# Patient Record
Sex: MALE | Race: WHITE | NOT HISPANIC OR LATINO | Employment: FULL TIME
[De-identification: names, ages, dates, MRNs, and addresses within clinical notes are randomized per-mention and may not be internally consistent; named-entity substitution may affect disease eponyms.]

---

## 2022-08-27 ENCOUNTER — HEALTH MAINTENANCE LETTER (OUTPATIENT)
Age: 42
End: 2022-08-27

## 2022-10-18 ENCOUNTER — OFFICE VISIT (OUTPATIENT)
Dept: PEDIATRICS | Facility: CLINIC | Age: 42
End: 2022-10-18
Payer: COMMERCIAL

## 2022-10-18 VITALS
DIASTOLIC BLOOD PRESSURE: 70 MMHG | RESPIRATION RATE: 14 BRPM | HEIGHT: 72 IN | TEMPERATURE: 97.4 F | HEART RATE: 87 BPM | BODY MASS INDEX: 35.13 KG/M2 | OXYGEN SATURATION: 98 % | SYSTOLIC BLOOD PRESSURE: 124 MMHG | WEIGHT: 259.4 LBS

## 2022-10-18 DIAGNOSIS — Z13.1 SCREENING FOR DIABETES MELLITUS: ICD-10-CM

## 2022-10-18 DIAGNOSIS — Z13.220 SCREENING FOR HYPERLIPIDEMIA: ICD-10-CM

## 2022-10-18 DIAGNOSIS — Z00.00 ROUTINE GENERAL MEDICAL EXAMINATION AT A HEALTH CARE FACILITY: Primary | ICD-10-CM

## 2022-10-18 DIAGNOSIS — Z11.59 NEED FOR HEPATITIS C SCREENING TEST: ICD-10-CM

## 2022-10-18 DIAGNOSIS — Z23 NEED FOR HPV VACCINATION: ICD-10-CM

## 2022-10-18 DIAGNOSIS — Z30.09 VASECTOMY EVALUATION: ICD-10-CM

## 2022-10-18 DIAGNOSIS — Z23 NEED FOR TDAP VACCINATION: ICD-10-CM

## 2022-10-18 DIAGNOSIS — Z23 NEED FOR HEPATITIS B VACCINATION: ICD-10-CM

## 2022-10-18 LAB — HBA1C MFR BLD: 5.3 % (ref 0–5.6)

## 2022-10-18 PROCEDURE — 36415 COLL VENOUS BLD VENIPUNCTURE: CPT | Performed by: STUDENT IN AN ORGANIZED HEALTH CARE EDUCATION/TRAINING PROGRAM

## 2022-10-18 PROCEDURE — 86803 HEPATITIS C AB TEST: CPT | Performed by: STUDENT IN AN ORGANIZED HEALTH CARE EDUCATION/TRAINING PROGRAM

## 2022-10-18 PROCEDURE — 80053 COMPREHEN METABOLIC PANEL: CPT | Performed by: STUDENT IN AN ORGANIZED HEALTH CARE EDUCATION/TRAINING PROGRAM

## 2022-10-18 PROCEDURE — 90746 HEPB VACCINE 3 DOSE ADULT IM: CPT | Performed by: STUDENT IN AN ORGANIZED HEALTH CARE EDUCATION/TRAINING PROGRAM

## 2022-10-18 PROCEDURE — 90471 IMMUNIZATION ADMIN: CPT | Performed by: STUDENT IN AN ORGANIZED HEALTH CARE EDUCATION/TRAINING PROGRAM

## 2022-10-18 PROCEDURE — 80061 LIPID PANEL: CPT | Performed by: STUDENT IN AN ORGANIZED HEALTH CARE EDUCATION/TRAINING PROGRAM

## 2022-10-18 PROCEDURE — 90715 TDAP VACCINE 7 YRS/> IM: CPT | Performed by: STUDENT IN AN ORGANIZED HEALTH CARE EDUCATION/TRAINING PROGRAM

## 2022-10-18 PROCEDURE — 90472 IMMUNIZATION ADMIN EACH ADD: CPT | Performed by: STUDENT IN AN ORGANIZED HEALTH CARE EDUCATION/TRAINING PROGRAM

## 2022-10-18 PROCEDURE — 99386 PREV VISIT NEW AGE 40-64: CPT | Mod: 25 | Performed by: STUDENT IN AN ORGANIZED HEALTH CARE EDUCATION/TRAINING PROGRAM

## 2022-10-18 PROCEDURE — 83036 HEMOGLOBIN GLYCOSYLATED A1C: CPT | Performed by: STUDENT IN AN ORGANIZED HEALTH CARE EDUCATION/TRAINING PROGRAM

## 2022-10-18 PROCEDURE — 90651 9VHPV VACCINE 2/3 DOSE IM: CPT | Performed by: STUDENT IN AN ORGANIZED HEALTH CARE EDUCATION/TRAINING PROGRAM

## 2022-10-18 SDOH — ECONOMIC STABILITY: TRANSPORTATION INSECURITY
IN THE PAST 12 MONTHS, HAS LACK OF TRANSPORTATION KEPT YOU FROM MEETINGS, WORK, OR FROM GETTING THINGS NEEDED FOR DAILY LIVING?: NO

## 2022-10-18 SDOH — ECONOMIC STABILITY: FOOD INSECURITY: WITHIN THE PAST 12 MONTHS, YOU WORRIED THAT YOUR FOOD WOULD RUN OUT BEFORE YOU GOT MONEY TO BUY MORE.: NEVER TRUE

## 2022-10-18 SDOH — ECONOMIC STABILITY: TRANSPORTATION INSECURITY
IN THE PAST 12 MONTHS, HAS THE LACK OF TRANSPORTATION KEPT YOU FROM MEDICAL APPOINTMENTS OR FROM GETTING MEDICATIONS?: NO

## 2022-10-18 SDOH — ECONOMIC STABILITY: FOOD INSECURITY: WITHIN THE PAST 12 MONTHS, THE FOOD YOU BOUGHT JUST DIDN'T LAST AND YOU DIDN'T HAVE MONEY TO GET MORE.: NEVER TRUE

## 2022-10-18 SDOH — HEALTH STABILITY: PHYSICAL HEALTH: ON AVERAGE, HOW MANY MINUTES DO YOU ENGAGE IN EXERCISE AT THIS LEVEL?: 20 MIN

## 2022-10-18 SDOH — ECONOMIC STABILITY: INCOME INSECURITY: HOW HARD IS IT FOR YOU TO PAY FOR THE VERY BASICS LIKE FOOD, HOUSING, MEDICAL CARE, AND HEATING?: NOT HARD AT ALL

## 2022-10-18 SDOH — HEALTH STABILITY: PHYSICAL HEALTH: ON AVERAGE, HOW MANY DAYS PER WEEK DO YOU ENGAGE IN MODERATE TO STRENUOUS EXERCISE (LIKE A BRISK WALK)?: 6 DAYS

## 2022-10-18 SDOH — ECONOMIC STABILITY: INCOME INSECURITY: IN THE LAST 12 MONTHS, WAS THERE A TIME WHEN YOU WERE NOT ABLE TO PAY THE MORTGAGE OR RENT ON TIME?: NO

## 2022-10-18 ASSESSMENT — ENCOUNTER SYMPTOMS
NERVOUS/ANXIOUS: 0
HEADACHES: 0
NAUSEA: 0
SHORTNESS OF BREATH: 0
COUGH: 0
CHILLS: 0
WEAKNESS: 0
EYE PAIN: 0
DIARRHEA: 0
ABDOMINAL PAIN: 0
HEARTBURN: 0
SORE THROAT: 0
DIZZINESS: 0
JOINT SWELLING: 0
HEMATOCHEZIA: 0
PALPITATIONS: 0
CONSTIPATION: 0
HEMATURIA: 0
ARTHRALGIAS: 0
FREQUENCY: 0
MYALGIAS: 0
DYSURIA: 0
FEVER: 0
PARESTHESIAS: 0

## 2022-10-18 ASSESSMENT — LIFESTYLE VARIABLES
SKIP TO QUESTIONS 9-10: 0
HOW OFTEN DO YOU HAVE SIX OR MORE DRINKS ON ONE OCCASION: PATIENT DECLINED
AUDIT-C TOTAL SCORE: -1
HOW OFTEN DO YOU HAVE A DRINK CONTAINING ALCOHOL: 2-3 TIMES A WEEK
HOW MANY STANDARD DRINKS CONTAINING ALCOHOL DO YOU HAVE ON A TYPICAL DAY: 3 OR 4

## 2022-10-18 ASSESSMENT — SOCIAL DETERMINANTS OF HEALTH (SDOH)
ARE YOU MARRIED, WIDOWED, DIVORCED, SEPARATED, NEVER MARRIED, OR LIVING WITH A PARTNER?: LIVING WITH PARTNER
HOW OFTEN DO YOU ATTEND CHURCH OR RELIGIOUS SERVICES?: NEVER
HOW OFTEN DO YOU GET TOGETHER WITH FRIENDS OR RELATIVES?: PATIENT DECLINED
IN A TYPICAL WEEK, HOW MANY TIMES DO YOU TALK ON THE PHONE WITH FAMILY, FRIENDS, OR NEIGHBORS?: MORE THAN THREE TIMES A WEEK
DO YOU BELONG TO ANY CLUBS OR ORGANIZATIONS SUCH AS CHURCH GROUPS UNIONS, FRATERNAL OR ATHLETIC GROUPS, OR SCHOOL GROUPS?: NO

## 2022-10-18 NOTE — PROGRESS NOTES
SUBJECTIVE:   CC: Mars is an 42 year old who presents for preventative health visit.     Patient has been advised of split billing requirements and indicates understanding: Yes     Healthy Habits:     Getting at least 3 servings of Calcium per day:  NO    Bi-annual eye exam:  NO    Dental care twice a year:  NO    Sleep apnea or symptoms of sleep apnea:  None    Diet:  Regular (no restrictions)    Frequency of exercise:  2-3 days/week    Duration of exercise:  15-30 minutes    Taking medications regularly:  Yes    Medication side effects:  None    PHQ-2 Total Score: 0    Additional concerns today:  Yes    Lives in [place]  [place] live [place] in [facility]  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 Yes    Have you ever done Advance Care Planning? (For example, a Health Directive, POLST, or a discussion with a medical provider or your loved ones about your wishes): No, advance care planning information given to patient to review.  Patient declined advance care planning discussion at this time.    Social History     Tobacco Use     Smoking status: Never     Passive exposure: Never     Smokeless tobacco: Never   Substance Use Topics     Alcohol use: Not on file         Alcohol Use 10/18/2022   Prescreen: >3 drinks/day or >7 drinks/week? Yes   AUDIT SCORE  6       Last PSA: No results found for: PSA    Reviewed orders with patient. Reviewed health maintenance and updated orders accordingly - Yes  Lab work is in process    Reviewed and updated as  needed this visit by clinical staff   Tobacco     Med Hx  Surg Hx  Fam Hx        [place] on 10/18/2022 at 9:54 AM    Reviewed and updated as needed this visit by Provider                   Review of Systems   Constitutional: Negative for chills and fever.   HENT: Negative for congestion, ear pain, hearing loss and sore throat.    Eyes: Negative for pain and visual disturbance.   Respiratory: Negative for cough and shortness of breath.    Cardiovascular: Negative for chest pain, palpitations and peripheral edema.   Gastrointestinal: Negative for abdominal pain, constipation, diarrhea, heartburn, hematochezia and nausea.   Genitourinary: Negative for dysuria, frequency, genital sores, hematuria, impotence, penile discharge and urgency.   Musculoskeletal: Negative for arthralgias, joint swelling and myalgias.   Skin: Negative for rash.   Neurological: Negative for dizziness, weakness, headaches and paresthesias.   Psychiatric/Behavioral: Negative for mood changes. The patient is not nervous/anxious.      OBJECTIVE:   /70 (BP Location: Right arm, Patient Position: Sitting, Cuff Size: Adult Large)   Pulse 87   Temp 97.4  F (36.3  C) (Temporal)   Resp 14   Ht 1.829 m (6')   Wt 117.7 kg (259 lb 6.4 oz)   SpO2 98%   BMI 35.18 kg/m      Physical Exam  GENERAL: healthy, alert and no distress  EYES: Eyes grossly normal to inspection, and conjunctivae and sclerae normal  HENT: ear canals and TM's normal  NECK: no adenopathy, no asymmetry, masses, or scars and thyroid normal to palpation  RESP: lungs clear to auscultation - no rales, rhonchi or wheezes  CV: regular rate and rhythm, normal S1 S2, no S3 or S4, no murmur, click or rub, no peripheral edema  MS: no gross musculoskeletal defects noted, no edema  SKIN: no suspicious lesions or rashes  NEURO: Normal strength and tone, mentation intact and speech normal  PSYCH: mentation appears normal, affect normal/bright    ASSESSMENT/PLAN:       ICD-10-CM    1.  Routine general medical examination at a health care facility  Z00.00       2. Need for hepatitis C screening test  Z11.59 Hepatitis C Screen Reflex to HCV RNA Quant and Genotype     Hepatitis C Screen Reflex to HCV RNA Quant and Genotype      3. Screening for hyperlipidemia  Z13.220 Lipid panel reflex to direct LDL Non-fasting     Lipid panel reflex to direct LDL Non-fasting      4. Screening for diabetes mellitus  Z13.1 Comprehensive metabolic panel (BMP + Alb, Alk Phos, ALT, AST, Total. Bili, TP)     Hemoglobin A1c     Comprehensive metabolic panel (BMP + Alb, Alk Phos, ALT, AST, Total. Bili, TP)     Hemoglobin A1c      5. Need for Tdap vaccination  Z23 TDAP VACCINE (Adacel, Boostrix)      6. Need for hepatitis B vaccination  Z23 HEPATITIS B VACCINE, ADULT, IM      7. Need for HPV vaccination  Z23 HPV, IM (9 - 26 YRS) - Gardasil 9      8. Vasectomy evaluation  Z30.09 Adult Urology  Referral          COUNSELING:   Reviewed preventive health counseling, as reflected in patient instructions    Estimated body mass index is 35.18 kg/m  as calculated from the following:    Height as of this encounter: 1.829 m (6').    Weight as of this encounter: 117.7 kg (259 lb 6.4 oz).       He reports that he has never smoked. He has never been exposed to tobacco smoke. He has never used smokeless tobacco.      Counseling Resources:  ATP IV Guidelines  Pooled Cohorts Equation Calculator  FRAX Risk Assessment  ICSI Preventive Guidelines  Dietary Guidelines for Americans, 2010  USDA's MyPlate  ASA Prophylaxis  Lung CA Screening    Giselle Braga MD  [facility]

## 2022-10-19 LAB
ALBUMIN SERPL-MCNC: 4.6 G/DL (ref 3.4–5)
ALP SERPL-CCNC: 35 U/L (ref 40–150)
ALT SERPL W P-5'-P-CCNC: 81 U/L (ref 0–70)
ANION GAP SERPL CALCULATED.3IONS-SCNC: 8 MMOL/L (ref 3–14)
AST SERPL W P-5'-P-CCNC: 49 U/L (ref 0–45)
BILIRUB SERPL-MCNC: 0.8 MG/DL (ref 0.2–1.3)
BUN SERPL-MCNC: 12 MG/DL (ref 7–30)
CALCIUM SERPL-MCNC: 9.9 MG/DL (ref 8.5–10.1)
CHLORIDE BLD-SCNC: 104 MMOL/L (ref 94–109)
CHOLEST SERPL-MCNC: 267 MG/DL
CO2 SERPL-SCNC: 25 MMOL/L (ref 20–32)
CREAT SERPL-MCNC: 0.97 MG/DL (ref 0.66–1.25)
FASTING STATUS PATIENT QL REPORTED: NO
GFR SERPL CREATININE-BSD FRML MDRD: >90 ML/MIN/1.73M2
GLUCOSE BLD-MCNC: 83 MG/DL (ref 70–99)
HCV AB SERPL QL IA: NONREACTIVE
HDLC SERPL-MCNC: 41 MG/DL
LDLC SERPL CALC-MCNC: 154 MG/DL
NONHDLC SERPL-MCNC: 226 MG/DL
POTASSIUM BLD-SCNC: 4.5 MMOL/L (ref 3.4–5.3)
PROT SERPL-MCNC: 8 G/DL (ref 6.8–8.8)
SODIUM SERPL-SCNC: 137 MMOL/L (ref 133–144)
TRIGL SERPL-MCNC: 361 MG/DL

## 2022-12-08 ENCOUNTER — VIRTUAL VISIT (OUTPATIENT)
Dept: UROLOGY | Facility: CLINIC | Age: 42
End: 2022-12-08
Payer: COMMERCIAL

## 2022-12-08 DIAGNOSIS — Z30.09 VASECTOMY EVALUATION: ICD-10-CM

## 2022-12-08 PROCEDURE — 99203 OFFICE O/P NEW LOW 30 MIN: CPT | Mod: 95 | Performed by: UROLOGY

## 2022-12-08 NOTE — PROGRESS NOTES
Mars is a 42 year old who is being evaluated via a billable video visit.      How would you like to obtain your AVS? MyChart  If the video visit is dropped, the invitation should be resent by:   Will anyone else be joining your video visit?               Subjective   Mars is a 42 year old, presenting for the following health issues:  Video Visit      HPI     Patient is requested to be seen by Dr. Braga for vasectomy consult.  He has no children.    Review of Systems   Constitutional, HEENT, cardiovascular, pulmonary, gi and gu systems are negative, except as otherwise noted.      Objective           Vitals:  No vitals were obtained today due to virtual visit.    Physical Exam   GENERAL: Healthy, alert and no distress  EYES: Eyes grossly normal to inspection.  No discharge or erythema, or obvious scleral/conjunctival abnormalities.  RESP: No audible wheeze, cough, or visible cyanosis.  No visible retractions or increased work of breathing.    SKIN: Visible skin clear. No significant rash, abnormal pigmentation or lesions.  NEURO: Cranial nerves grossly intact.  Mentation and speech appropriate for age.  PSYCH: Mentation appears normal, affect normal/bright, judgement and insight intact, normal speech and appearance well-groomed.        Discussed    That vasectomy is permanent method of birth control.    That vasectomy can fail due to recanalization of the vas even many months/years later.    That he needs 2 negative sperm checks to be considered sterile    That there are other methods that are not permanent and also that the sperm can be frozen for later use.    How the technique is performed, risks of infection, bleeding, damage to the testes vessels and testes atrophy    Long term complication such as chronic and difficult to treat testes pain and questionable increase incidence of prostate cancer    That the procedure can be done at the clinic or hospital OR        Plan:    Stop Aspirin  Will schedule Vasectomy in  the future          Video-Visit Details    Video Start Time: 910    Type of service:  Video Visit    Video End Time:9:16 AM    Originating Location (pt. Location): Home        Distant Location (provider location):  Off-site    Platform used for Video Visit: Cesario

## 2022-12-08 NOTE — PATIENT INSTRUCTIONS
Your Vasectomy is scheduled on 2/24/23 at 11:15AM in [facility]. Please arrive at 1100AM.  Please call [phone] if you need to reschedule this appointment or if you have any questions.     Preparation for Vasectomy:  1. EAT BEFORE YOU COME  2. Shave the hair away from the base of your penis and around your testicles.  3. Wear snug underwear the day of the vasectomy to support your testicles.  4. Do not take aspirin, ibuprofen, advil, motrin, aleve products one week prior to your vasectomy.        General Vasectomy Information    Vasectomy is a surgery.  If it is successful, you will not be able to father children.  The following information regards the male sterilization done by an operation called a vasectomy.  This is done in the physician's office.    The operation done to sterilize the male is easier, safer and much less expensive than the operation done to sterilize the woman.    Sterilization should be considered permanent.  There are attempts made occasionally to reconnect the tubes that have been cut during the procedure, but this is very difficult and expensive and works only about 50-70% of the time.  In order for any of the physicians in our clinic to do a vasectomy, we require that you consider this a permanent form a sterilization.    A vasectomy can be done at any time, but it is best to think about having it done when you can take at least one day off from work and any excess activities.    Your decision to have a vasectomy should only be made with the following facts clear in mind.    1. First, a vasectomy is only one of several means of birth control.  Many forms of temporary contraception are available.  If you have any questions about other methods, please discuss this with your physician.    2. A vasectomy may be unsuccessful in approximately one out of 1000 per year.  This occurs when the tubes which are cut during the procedure reconnect themselves.  Sterility cannot be guaranteed.    3. You  should be aware that it is the current belief of the medical profession that a vasectomy procedure does not alter a male physically, physiologically or sexually.  Because each person is a unique individual, there is always the possibility of an adverse phychiatric reaction.  This can be best avoided by being very comfortable in your own mind that you want to have this done, and that you do not want to father any children in the future.  If this is not clear in your mind, this should be further discussed with your physician.    4. You will not notice a change in the volume of your ejaculate since sperm is a very small amount of the semen and it is only the sperm that is stopped from entering the ejaculate after a vasectomy.  Your prostate and seminal vesicle glands really supply most of the semen and this is not at all decreased after a vasectomy.  Also there is no effect on the male hormones.    5. You do not become sterile immediately following a vasectomy due to the fact that there is still sperm remaining in your system that must be eliminated by ejaculation.  For this reason, your sexual partner could still become pregnant for a period of time following the vasectomy operation.  It is necessary that contraceptive measures be used until you receive confirmation from your physician that you are sterile.  It takes approximately 30-40 ejaculations to clear the semen of sperm, but this can differ in different men.  For this reason, it is very important that your semen be checked for sperm before you are considered sterile, and this should be done approximately 12 weeks after your vasectomy.      6. Vasectomy has risks and benefits.  Among the risks are possible complications resulting from the procedure.  These risks include but are not limited to:   A.  Bleeding, infection, or hematoma occuring during or in the recovery period from the procedure.   B.  Sperm granuloma or a small pea to walnut sized lump which is a  collection of scar tissue and sperm in your scrotal sack and remains permanently   C.  There may be an increased risk of prostate cancer although the data is unclear.

## 2023-02-07 ENCOUNTER — HOSPITAL ENCOUNTER (OUTPATIENT)
Dept: RESEARCH | Facility: CLINIC | Age: 43
Discharge: HOME | End: 2023-02-07
Attending: INTERNAL MEDICINE
Payer: COMMERCIAL

## 2023-02-07 ENCOUNTER — LAB REQUISITION (OUTPATIENT)
Dept: LAB | Facility: CLINIC | Age: 43
End: 2023-02-07

## 2023-02-07 ENCOUNTER — TELEPHONE (OUTPATIENT)
Dept: ENDOCRINOLOGY | Facility: CLINIC | Age: 43
End: 2023-02-07
Payer: COMMERCIAL

## 2023-02-07 LAB
ALT SERPL W P-5'-P-CCNC: 95 U/L (ref 10–50)
AST SERPL W P-5'-P-CCNC: 62 U/L (ref 10–50)
CHOLEST SERPL-MCNC: 253 MG/DL
CREAT SERPL-MCNC: 0.82 MG/DL (ref 0.67–1.17)
FASTING STATUS PATIENT QL REPORTED: NORMAL
GFR SERPL CREATININE-BSD FRML MDRD: >90 ML/MIN/1.73M2
GLUCOSE SERPL-MCNC: 96 MG/DL (ref 70–99)
HDLC SERPL-MCNC: 45 MG/DL
HGB BLD-MCNC: 15.4 G/DL (ref 13.3–17.7)
HOLD SPECIMEN: NORMAL
INSULIN SERPL-ACNC: 33.8 UU/ML (ref 2.6–24.9)
LDLC SERPL CALC-MCNC: 148 MG/DL
NONHDLC SERPL-MCNC: 208 MG/DL
TRIGL SERPL-MCNC: 301 MG/DL
TSH SERPL DL<=0.005 MIU/L-ACNC: 1.93 UIU/ML (ref 0.3–4.2)

## 2023-02-07 PROCEDURE — 82565 ASSAY OF CREATININE: CPT | Performed by: INTERNAL MEDICINE

## 2023-02-07 PROCEDURE — 80061 LIPID PANEL: CPT | Performed by: INTERNAL MEDICINE

## 2023-02-07 PROCEDURE — 84443 ASSAY THYROID STIM HORMONE: CPT | Performed by: INTERNAL MEDICINE

## 2023-02-07 PROCEDURE — 82947 ASSAY GLUCOSE BLOOD QUANT: CPT | Performed by: INTERNAL MEDICINE

## 2023-02-07 PROCEDURE — 510N000009 HC RESEARCH FACILITY, PER 15 MIN

## 2023-02-07 PROCEDURE — 84460 ALANINE AMINO (ALT) (SGPT): CPT | Performed by: INTERNAL MEDICINE

## 2023-02-07 PROCEDURE — 85018 HEMOGLOBIN: CPT | Performed by: INTERNAL MEDICINE

## 2023-02-07 PROCEDURE — 300N000003 HC RESEARCH SPECIMEN PROCESSING, SIMPLE

## 2023-02-07 PROCEDURE — 510N000017 HC CRU PATIENT CARE, PER 15 MIN

## 2023-02-07 PROCEDURE — 84450 TRANSFERASE (AST) (SGOT): CPT | Performed by: INTERNAL MEDICINE

## 2023-02-07 PROCEDURE — 83036 HEMOGLOBIN GLYCOSYLATED A1C: CPT | Performed by: INTERNAL MEDICINE

## 2023-02-07 PROCEDURE — 83525 ASSAY OF INSULIN: CPT | Performed by: INTERNAL MEDICINE

## 2023-02-07 NOTE — RESULT ENCOUNTER NOTE
Dear Mars    Here are your labs which show a higher HgbA1c  (no diabetes yet) and insulin suggesting of insulin resistance and the higher liver tests which are still acceptable for our study. You have a history of a fatty liver and likely these elevated tests are consistent with the fatty liver. We will recheck at the end of the study and would be delighted to you have continue with our study if you would like    Regards    Dr. Phillips

## 2023-02-07 NOTE — ADDENDUM NOTE
Encounter addended by: Eben Moreno on: 2/7/2023 5:34 PM   Actions taken: Charge Capture section accepted

## 2023-02-07 NOTE — LETTER
Patient:  Mars Beck  :   1980  MRN:     [id number]        Mr.John KAYLEE Beck  [address] [facility] [address]        2023    Dear Mars    Here are your labs which show a higher HgbA1c  (no diabetes yet) and insulin suggesting of insulin resistance and the higher liver tests which are still acceptable for our study. You have a history of a fatty liver and likely these elevated tests are consistent with the fatty liver. We will recheck at the end of the study and would be delighted to you have continue with our study if you would like    Regards    Dr. Phillips      Resulted Orders   AST   Result Value Ref Range    AST 62 (H) 10 - 50 U/L   ALT   Result Value Ref Range    ALT 95 (H) 10 - 50 U/L   Glucose   Result Value Ref Range    Glucose 96 70 - 99 mg/dL    Patient Fasting > 8hrs? Unknown    Creatinine   Result Value Ref Range    Creatinine 0.82 0.67 - 1.17 mg/dL    GFR Estimate >90 >60 mL/min/1.73m2      Comment:      eGFR calculated using  CKD-EPI equation.   TSH   Result Value Ref Range    TSH 1.93 0.30 - 4.20 uIU/mL   Lipid Profile   Result Value Ref Range    Cholesterol 253 (H) <200 mg/dL    Triglycerides 301 (H) <150 mg/dL    Direct Measure HDL 45 >=40 mg/dL    LDL Cholesterol Calculated 148 (H) <=100 mg/dL    Non HDL Cholesterol 208 (H) <130 mg/dL    Narrative    Cholesterol  Desirable:  <200 mg/dL    Triglycerides  Normal:  Less than 150 mg/dL  Borderline High:  150-199 mg/dL  High:  200-499 mg/dL  Very High:  Greater than or equal to 500 mg/dL    Direct Measure HDL  Female:  Greater than or equal to 50 mg/dL   Male:  Greater than or equal to 40 mg/dL    LDL Cholesterol  Desirable:  <100mg/dL  Above Desirable:  100-129 mg/dL   Borderline High:  130-159 mg/dL   High:  160-189 mg/dL   Very High:  >= 190 mg/dL    Non HDL Cholesterol  Desirable:  130 mg/dL  Above Desirable:  130-159 mg/dL  Borderline High:  160-189 mg/dL  High:  190-219 mg/dL  Very High:  Greater than or equal to 220  mg/dL   Hemoglobin   Result Value Ref Range    Hemoglobin 15.4 13.3 - 17.7 g/dL   Hemoglobin A1c   Result Value Ref Range    Hemoglobin A1C 6.4 (H) <5.7 %      Comment:      Normal <5.7%   Prediabetes 5.7-6.4%    Diabetes 6.5% or higher     Note: Adopted from ADA consensus guidelines.   Insulin level   Result Value Ref Range    Insulin 33.8 (H) 2.6 - 24.9 uU/mL   Extra Green Top Tube (LAB USE ONLY)   Result Value Ref Range    Hold Specimen [facility]        Brooke Phillips MD

## 2023-02-07 NOTE — TELEPHONE ENCOUNTER
Pt does qualify for our study. Will recheck LFTs at end of study  -  Dear Mars    Here are your labs which show a higher HgbA1c  (no diabetes yet) and insulin suggesting of insulin resistance and the higher liver tests which are still acceptable for our study. You have a history of a fatty liver and likely these elevated tests are consistent with the fatty liver. We will recheck at the end of the study and would be delighted to you have continue with our study if you would like    Regards    Dr. Phillips    Lab Requisition on 02/07/2023   Component Date Value Ref Range Status     AST 02/07/2023 62 (H)  10 - 50 U/L Final     ALT 02/07/2023 95 (H)  10 - 50 U/L Final     Glucose 02/07/2023 96  70 - 99 mg/dL Final     Patient Fasting > 8hrs? 02/07/2023 Unknown   Final     Creatinine 02/07/2023 0.82  0.67 - 1.17 mg/dL Final     GFR Estimate 02/07/2023 >90  >60 mL/min/1.73m2 Final    eGFR calculated using 2021 CKD-EPI equation.     TSH 02/07/2023 1.93  0.30 - 4.20 uIU/mL Final     Cholesterol 02/07/2023 253 (H)  <200 mg/dL Final     Triglycerides 02/07/2023 301 (H)  <150 mg/dL Final     Direct Measure HDL 02/07/2023 45  >=40 mg/dL Final     LDL Cholesterol Calculated 02/07/2023 148 (H)  <=100 mg/dL Final     Non HDL Cholesterol 02/07/2023 208 (H)  <130 mg/dL Final     Hemoglobin 02/07/2023 15.4  13.3 - 17.7 g/dL Final     Hemoglobin A1C 02/07/2023 6.4 (H)  <5.7 % Final    Normal <5.7%   Prediabetes 5.7-6.4%    Diabetes 6.5% or higher     Note: Adopted from ADA consensus guidelines.     Insulin 02/07/2023 33.8 (H)  2.6 - 24.9 uU/mL Final     Hold Specimen 02/07/2023 [facility]   Final

## 2023-02-24 ENCOUNTER — OFFICE VISIT (OUTPATIENT)
Dept: UROLOGY | Facility: CLINIC | Age: 43
End: 2023-02-24
Payer: COMMERCIAL

## 2023-02-24 DIAGNOSIS — Z30.2 ENCOUNTER FOR STERILIZATION: Primary | ICD-10-CM

## 2023-02-24 PROCEDURE — 55250 REMOVAL OF SPERM DUCT(S): CPT | Performed by: UROLOGY

## 2023-02-24 PROCEDURE — 88302 TISSUE EXAM BY PATHOLOGIST: CPT | Mod: 59 | Performed by: PATHOLOGY

## 2023-02-24 PROCEDURE — 99000 SPECIMEN HANDLING OFFICE-LAB: CPT | Performed by: UROLOGY

## 2023-02-28 LAB
PATH REPORT.COMMENTS IMP SPEC: NORMAL
PATH REPORT.COMMENTS IMP SPEC: NORMAL
PATH REPORT.FINAL DX SPEC: NORMAL
PATH REPORT.GROSS SPEC: NORMAL
PATH REPORT.MICROSCOPIC SPEC OTHER STN: NORMAL
PATH REPORT.RELEVANT HX SPEC: NORMAL
PHOTO IMAGE: NORMAL

## 2023-03-06 ENCOUNTER — NURSE TRIAGE (OUTPATIENT)
Dept: UROLOGY | Facility: CLINIC | Age: 43
End: 2023-03-06
Payer: COMMERCIAL

## 2023-03-06 DIAGNOSIS — Z30.2 ENCOUNTER FOR STERILIZATION: ICD-10-CM

## 2023-03-06 LAB — HBA1C MFR BLD: NORMAL %

## 2023-03-06 NOTE — TELEPHONE ENCOUNTER
Writer called and spoke with patient. Pt states the left side is dime sized and  hard. Near incision. Throbbing. Pt has taken ibuprofen for pain.       No need for patinet to come into clinic at this time. Pt was instructed to monitor for changes , s/s of infection and notify our office if needed.    Nati ORO RN Urology 3/6/2023 1:29 PM         Never smoker

## 2023-03-06 NOTE — TELEPHONE ENCOUNTER
42 year old male sp vasectomy on 2/24/23.  He calls today because he is still experieincing pain on his right testicle  He states his left side has healed and feels fine  His right side is painful and is hard in an area by the incision  He denies redness,warmth,excess swelling,drainage or fever    Will forward to urology team to follow-up              Reason for Disposition    Caller has NON-URGENT question and triager unable to answer question    Additional Information    Negative: Major abdominal surgical incision and wound gaping open with visible internal organs    Negative: Sounds like a life-threatening emergency to the triager    Negative: Bleeding from incision and won't stop after 10 minutes of direct pressure    Negative: Bleeding (more than a few drops) from incision and after blood vessel surgery (e.g., carotidendarterectomy, femoral bypass graft, kidney dialysis fistula, tracheostomy)    Negative: Bright red, wide-spread, sunburn-like rash    Negative: SEVERE pain in the incision    Negative: Incision gaping open and < 2 days (48 hours) since wound re-opened    Negative: Incision gaping open and length of opening > 2 inches (5 cm)    Negative: Patient sounds very sick or weak to the triager    Negative: Sounds like a serious complication to the triager    Negative: Fever > 100.4 F (38.0 C)    Negative: Incision looks infected (spreading redness, pain)    Negative: Red streak runs from the incision and longer than 1 inch (2.5 cm)    Negative: Pus or bad-smelling fluid draining from incision    Negative: Dressing soaked with blood or body fluid (e.g., drainage)    Negative: Raised bruise and size > 2 inches (5 cm) and expanding    Negative: Scant bleeding (e.g., few drops) from incision and after blood vessel surgery (e.g., carotid endarterectomy, femoral bypass graft, kidney dialysis fistula    Negative: Caller has URGENT question and triager unable to answer question    Negative: Incision gaping open  "and length of opening > 1/4 inch (6 mm) and on the face and over 2 days since wound re-opened    Negative: Incision gaping open and length of opening > 1/2 inch (1 cm) and over 2 days since wound re-opened    Negative: Patient wants to be seen    Negative: Clear or blood-tinged fluid draining from wound and no fever    Negative: Suture or staple removal is overdue    Answer Assessment - Initial Assessment Questions  1. SYMPTOM: \"What's the main symptom you're concerned about?\" (e.g., drainage, incision opening up, pain, redness)  Pain and right testicle hard  2. ONSET: \"When did pain  start?\"      Ongoing since the procedure  3. SURGERY: \"What surgery did you have?\"      vasectomy  4. DATE of SURGERY: \"When was the surgery?\"       2/24/23  5. INCISION SITE: \"Where is the incision located?\"       Incisions healed  6. REDNESS: \"Is there any redness at the incision site?\" If yes, ask: \"How wide across is the redness?\" (Inches, centimeters)       no  7. PAIN: \"Is there any pain?\" If Yes, ask: \"How bad is it?\"  (Scale 1-10; or mild, moderate, severe)    - NONE (0): no pain    - MILD (1-3): doesn't interfere with normal activities     - MODERATE (4-7): interferes with normal activities or awakens from sleep     - SEVERE (8-10): excruciating pain, unable to do any normal activities      Moderate on right side  8. BLEEDING: \"Is there any bleeding?\" If Yes, ask: \"How much?\" and \"Where?\"      no  9. DRAINAGE: \"Is there any drainage from the incision site?\" If yes, ask: \"What color and how much?\" (e.g., red, cloudy, pus; drops, teaspoon)      no  10. FEVER: \"Do you have a fever?\" If Yes, ask: \"What is your temperature, how was it measured, and when did it start?\"        no  11. OTHER SYMPTOMS: \"Do you have any other symptoms?\" (e.g., dizziness, rash elsewhere on body, shaking chills, weakness)        Left side has healed no pain and soft  Right side is uncomfortable and hard    Protocols used: POST-OP INCISION SYMPTOMS AND " [id number]

## 2023-03-06 NOTE — TELEPHONE ENCOUNTER
Caller reporting the following red-flag symptom(s): pt had a vasectomy done on 2/24/23 and is still having pain and discomfort     Per the system red-flag symptom policy, patient was instructed to:  speak with a Registered Nurse    Action:  Patient warm transferred to a Registered Nurse

## 2023-03-07 NOTE — RESULT ENCOUNTER NOTE
Dear Mars    There was a laboratory calibration issue which read your hemoglobin A1c higher than expected.  Your estimated hemoglobin A1c is actually 5.6 which falls within the normal range and would be just fine for our study.    Regards    Brooke Phillips MD

## 2023-03-16 ENCOUNTER — TELEPHONE (OUTPATIENT)
Dept: ENDOCRINOLOGY | Facility: CLINIC | Age: 43
End: 2023-03-16
Payer: COMMERCIAL

## 2023-03-16 ENCOUNTER — HOSPITAL ENCOUNTER (OUTPATIENT)
Dept: RESEARCH | Facility: CLINIC | Age: 43
Discharge: HOME | End: 2023-03-16
Attending: INTERNAL MEDICINE
Payer: COMMERCIAL

## 2023-03-16 ENCOUNTER — LAB REQUISITION (OUTPATIENT)
Dept: LAB | Facility: CLINIC | Age: 43
End: 2023-03-16

## 2023-03-16 VITALS — BODY MASS INDEX: 35.21 KG/M2 | WEIGHT: 259.92 LBS | HEIGHT: 72 IN

## 2023-03-16 DIAGNOSIS — Z00.6 EXAMINATION OF PARTICIPANT OR CONTROL IN CLINICAL RESEARCH: Primary | ICD-10-CM

## 2023-03-16 LAB
INSULIN SERPL-ACNC: 100 UU/ML (ref 2.6–24.9)
INSULIN SERPL-ACNC: 103 UU/ML (ref 2.6–24.9)
INSULIN SERPL-ACNC: 109 UU/ML (ref 2.6–24.9)
INSULIN SERPL-ACNC: 21.8 UU/ML (ref 2.6–24.9)
INSULIN SERPL-ACNC: 36 UU/ML (ref 2.6–24.9)
INSULIN SERPL-ACNC: 36.8 UU/ML (ref 2.6–24.9)
INSULIN SERPL-ACNC: 42.5 UU/ML (ref 2.6–24.9)
INSULIN SERPL-ACNC: 51.8 UU/ML (ref 2.6–24.9)
INSULIN SERPL-ACNC: 84.8 UU/ML (ref 2.6–24.9)

## 2023-03-16 PROCEDURE — 300N000003 HC RESEARCH SPECIMEN PROCESSING, SIMPLE

## 2023-03-16 PROCEDURE — 510N000016 HC RESEARCH MEALS, PER MEAL

## 2023-03-16 PROCEDURE — 300N000004 HC RESEARCH SPECIMEN PROCESSING, MODERATE

## 2023-03-16 PROCEDURE — 250N000009 HC RX 250: Performed by: INTERNAL MEDICINE

## 2023-03-16 PROCEDURE — 510N000017 HC CRU PATIENT CARE, PER 15 MIN

## 2023-03-16 PROCEDURE — 258N000001 HC RX 258: Performed by: INTERNAL MEDICINE

## 2023-03-16 PROCEDURE — 258N000002 HC RX IP 258 OP 250: Performed by: INTERNAL MEDICINE

## 2023-03-16 PROCEDURE — 510N000009 HC RESEARCH FACILITY, PER 15 MIN

## 2023-03-16 PROCEDURE — 510N000013 HC RESEARCH GLUCOSE CLAMP, PER CLAMP

## 2023-03-16 PROCEDURE — 250N000012 HC RX MED GY IP 250 OP 636 PS 637: Performed by: INTERNAL MEDICINE

## 2023-03-16 PROCEDURE — 83525 ASSAY OF INSULIN: CPT | Performed by: INTERNAL MEDICINE

## 2023-03-16 RX ORDER — DEXTROSE 20 G/100ML
500 INJECTION, SOLUTION INTRAVENOUS CONTINUOUS
Status: DISCONTINUED | OUTPATIENT
Start: 2023-03-16 | End: 2023-03-17 | Stop reason: HOSPADM

## 2023-03-16 RX ADMIN — POTASSIUM PHOSPHATE, MONOBASIC AND POTASSIUM PHOSPHATE, DIBASIC: 224; 236 INJECTION, SOLUTION INTRAVENOUS at 08:57

## 2023-03-16 RX ADMIN — INSULIN HUMAN 6 UNITS: 100 INJECTION, SOLUTION PARENTERAL at 08:57

## 2023-03-16 RX ADMIN — DEXTROSE 500 ML: 20 INJECTION, SOLUTION INTRAVENOUS at 13:05

## 2023-03-16 RX ADMIN — INSULIN HUMAN 24 UNITS: 100 INJECTION, SOLUTION PARENTERAL at 10:57

## 2023-03-16 NOTE — ADDENDUM NOTE
Encounter addended by: Eben Moreno on: 3/16/2023 3:04 PM   Actions taken: Charge Capture section accepted

## 2023-03-17 NOTE — TELEPHONE ENCOUNTER
-  Dear Mars    Thank you for being in our study. Your insulin is elevated due to the infusion and your body will process this naturally. We look forward to working with you!    Dr. Phillips     -  Component      Latest Ref Rng & Units 3/16/2023 3/16/2023 3/16/2023 3/16/2023           8:54 AM  9:27 AM  9:57 AM 10:27 AM   Insulin      2.6 - 24.9 uU/mL 21.8 42.5 (H) 51.8 (H) 36.8 (H)     Component      Latest Ref Rng & Units 3/16/2023 3/16/2023 3/16/2023 3/16/2023          10:57 AM 11:27 AM 11:57 AM 12:28 PM   Insulin      2.6 - 24.9 uU/mL 36.0 (H) 84.8 (H) 103.0 (H) 109.0 (H)     Component      Latest Ref Rng & Units 3/16/2023          12:57 PM   Insulin      2.6 - 24.9 uU/mL 100.0 (H)

## 2023-03-17 NOTE — RESULT ENCOUNTER NOTE
Bill Crews    Thank you for being in our study. Your insulin is elevated due to the infusion and your body will process this naturally. We look forward to working with you!    Dr. Phillips

## 2023-06-16 ENCOUNTER — HOSPITAL ENCOUNTER (OUTPATIENT)
Dept: RESEARCH | Facility: CLINIC | Age: 43
Discharge: HOME | End: 2023-06-16
Attending: INTERNAL MEDICINE | Admitting: INTERNAL MEDICINE
Payer: COMMERCIAL

## 2023-06-16 ENCOUNTER — TELEPHONE (OUTPATIENT)
Dept: ENDOCRINOLOGY | Facility: CLINIC | Age: 43
End: 2023-06-16
Payer: COMMERCIAL

## 2023-06-16 ENCOUNTER — LAB REQUISITION (OUTPATIENT)
Dept: LAB | Facility: CLINIC | Age: 43
End: 2023-06-16

## 2023-06-16 DIAGNOSIS — Z00.6 EXAMINATION OF PARTICIPANT OR CONTROL IN CLINICAL RESEARCH: Primary | ICD-10-CM

## 2023-06-16 LAB
ALT SERPL W P-5'-P-CCNC: 107 U/L (ref 0–70)
AST SERPL W P-5'-P-CCNC: 64 U/L (ref 0–45)
CHOLEST SERPL-MCNC: 248 MG/DL
FASTING STATUS PATIENT QL REPORTED: NORMAL
GLUCOSE SERPL-MCNC: 91 MG/DL (ref 70–99)
HBA1C MFR BLD: 5.4 %
HDLC SERPL-MCNC: 45 MG/DL
INSULIN SERPL-ACNC: 28.5 UU/ML (ref 2.6–24.9)
LDLC SERPL CALC-MCNC: 171 MG/DL
NONHDLC SERPL-MCNC: 203 MG/DL
TRIGL SERPL-MCNC: 160 MG/DL

## 2023-06-16 PROCEDURE — 84450 TRANSFERASE (AST) (SGOT): CPT | Performed by: INTERNAL MEDICINE

## 2023-06-16 PROCEDURE — 83036 HEMOGLOBIN GLYCOSYLATED A1C: CPT | Performed by: INTERNAL MEDICINE

## 2023-06-16 PROCEDURE — 258N000001 HC RX 258: Performed by: INTERNAL MEDICINE

## 2023-06-16 PROCEDURE — 96375 TX/PRO/DX INJ NEW DRUG ADDON: CPT

## 2023-06-16 PROCEDURE — 258N000002 HC RX IP 258 OP 250: Performed by: INTERNAL MEDICINE

## 2023-06-16 PROCEDURE — 250N000012 HC RX MED GY IP 250 OP 636 PS 637: Performed by: INTERNAL MEDICINE

## 2023-06-16 PROCEDURE — 82947 ASSAY GLUCOSE BLOOD QUANT: CPT | Performed by: INTERNAL MEDICINE

## 2023-06-16 PROCEDURE — 96376 TX/PRO/DX INJ SAME DRUG ADON: CPT

## 2023-06-16 PROCEDURE — 96374 THER/PROPH/DIAG INJ IV PUSH: CPT

## 2023-06-16 PROCEDURE — 510N000009 HC RESEARCH FACILITY, PER 15 MIN

## 2023-06-16 PROCEDURE — 84460 ALANINE AMINO (ALT) (SGPT): CPT | Performed by: INTERNAL MEDICINE

## 2023-06-16 PROCEDURE — 80061 LIPID PANEL: CPT | Performed by: INTERNAL MEDICINE

## 2023-06-16 PROCEDURE — 300N000004 HC RESEARCH SPECIMEN PROCESSING, MODERATE

## 2023-06-16 PROCEDURE — 510N000013 HC RESEARCH GLUCOSE CLAMP, PER CLAMP

## 2023-06-16 PROCEDURE — 83525 ASSAY OF INSULIN: CPT | Performed by: INTERNAL MEDICINE

## 2023-06-16 PROCEDURE — 300N000003 HC RESEARCH SPECIMEN PROCESSING, SIMPLE

## 2023-06-16 PROCEDURE — 250N000009 HC RX 250: Performed by: INTERNAL MEDICINE

## 2023-06-16 PROCEDURE — 510N000016 HC RESEARCH MEALS, PER MEAL

## 2023-06-16 PROCEDURE — 510N000017 HC CRU PATIENT CARE, PER 15 MIN

## 2023-06-16 RX ORDER — DEXTROSE 20 G/100ML
500 INJECTION, SOLUTION INTRAVENOUS CONTINUOUS
Status: DISCONTINUED | OUTPATIENT
Start: 2023-06-16 | End: 2023-06-17 | Stop reason: HOSPADM

## 2023-06-16 RX ADMIN — INSULIN HUMAN 6 UNITS: 100 INJECTION, SOLUTION PARENTERAL at 09:54

## 2023-06-16 RX ADMIN — DEXTROSE 500 ML: 20 INJECTION, SOLUTION INTRAVENOUS at 14:06

## 2023-06-16 RX ADMIN — POTASSIUM PHOSPHATE, MONOBASIC AND POTASSIUM PHOSPHATE, DIBASIC: 224; 236 INJECTION, SOLUTION INTRAVENOUS at 09:50

## 2023-06-17 ENCOUNTER — TELEPHONE (OUTPATIENT)
Dept: ENDOCRINOLOGY | Facility: CLINIC | Age: 43
End: 2023-06-17
Payer: COMMERCIAL

## 2023-06-17 NOTE — TELEPHONE ENCOUNTER
Component      Latest Ref [facility] 6/16/2023  7:45 AM 6/16/2023  9:50 AM 6/16/2023  10:25 AM   Cholesterol      <200 mg/dL 248 (H)      Triglycerides      <150 mg/dL 160 (H)      HDL Cholesterol      >=40 mg/dL 45      LDL Cholesterol Calculated      <=100 mg/dL 171 (H)      Non HDL Cholesterol      <130 mg/dL 203 (H)      Glucose      70 - 99 mg/dL 91      Patient Fasting? Unknown      Insulin      2.6 - 24.9 uU/mL 28.5 (H)  21.0  39.4 (H)    Insulin       30.7 (H)      Hemoglobin A1C      <5.7 % 5.4      AST      0 - 45 U/L 64 (H)      ALT      0 - 70 U/L 107 (H)        Component      Latest Ref [facility] 6/16/2023  10:54 AM 6/16/2023  11:24 AM 6/16/2023  11:54 AM   Cholesterol      <200 mg/dL      Triglycerides      <150 mg/dL      HDL Cholesterol      >=40 mg/dL      LDL Cholesterol Calculated      <=100 mg/dL      Non HDL Cholesterol      <130 mg/dL      Glucose      70 - 99 mg/dL      Patient Fasting?      Insulin      2.6 - 24.9 uU/mL 38.7 (H)  44.2 (H)  43.6 (H)    Hemoglobin A1C      <5.7 %      AST      0 - 45 U/L      ALT      0 - 70 U/L        Component      Latest Ref [facility] 6/16/2023  12:24 PM 6/16/2023  12:54 PM 6/16/2023  1:24 PM   Cholesterol      <200 mg/dL      Triglycerides      <150 mg/dL      HDL Cholesterol      >=40 mg/dL      LDL Cholesterol Calculated      <=100 mg/dL      Non HDL Cholesterol      <130 mg/dL      Glucose      70 - 99 mg/dL      Patient Fasting?      Insulin      2.6 - 24.9 uU/mL 110.0 (H)  104.0 (H)  81.4 (H)    Hemoglobin A1C      <5.7 %      AST      0 - 45 U/L      ALT      0 - 70 U/L        Component      Latest Ref [facility] 6/16/2023  1:54 PM   Cholesterol      <200 mg/dL    Triglycerides      <150 mg/dL    HDL Cholesterol      >=40 mg/dL    LDL Cholesterol Calculated      <=100 mg/dL    Non HDL Cholesterol      <130 mg/dL    Glucose      70 - 99 mg/dL    Patient Fasting?    Insulin      2.6 - 24.9 uU/mL 106.0 (H)    Hemoglobin A1C      <5.7 %    AST      0 - 45 U/L    ALT      0 - 70  U/L       Legend:  (H) High    Pt finished SFS3 - high insulin due to insulin infusion from clamp.

## 2023-06-17 NOTE — TELEPHONE ENCOUNTER
"Pt done with study - letter sent below to pt  -  Dear Mars     Thank you for your participation in the See Food 2 study. As you may be aware, the timing of your eating has been found to significantly improve health and metabolism in lab animals and may be a unique way that some humans may be able to lose weight and improve sleep and metabolism. Your participation in this study helps us understand how monitoring your diet might affect metabolism in humans and we are grateful for the time and dedication you have given to our efforts.     Please let us know if you have any questions related to the study. Also, please find attached your most recent labs.     The main finding is that your cholesterol (LDL) is a little higher now than previous (171 now, 148 previously), and that your triglycerides are lower (160 vs 301).  Also, you continue to have slightly higher liver tests (AST at 64 now, 62 previously, ALT at 107 now, 95 previously), which should be followed up by your primary provider.  The most common reason for this is \"fat in the liver\" - however we would defer to your primary provider  for further evaluation and work-up which we would recommend.      Thank you for your consent to answer our post-study surveys. There will be 3 electronic surveys sent to your email in total: the first survey at 1 month after you finish the study, and the second and third survey at 3 months and 6 months respectively after you finish the study.     Thank you for participating in our study and contributing to science!     Warmest Regards     Dr. Phillips and the SFS2 Study Team             Resulted Orders   Insulin level   Result Value Ref Range     Insulin 28.5 (H) 2.6 - 24.9 uU/mL   Glucose   Result Value Ref Range     Glucose 91 70 - 99 mg/dL     Patient Fasting > 8hrs? Unknown     Lipid Profile   Result Value Ref Range     Cholesterol 248 (H) <200 mg/dL     Triglycerides 160 (H) <150 mg/dL     Direct Measure HDL 45 >=40 mg/dL     LDL " Cholesterol Calculated 171 (H) <=100 mg/dL     Non HDL Cholesterol 203 (H) <130 mg/dL     Narrative     Cholesterol  Desirable:  <200 mg/dL     Triglycerides  Normal:  Less than 150 mg/dL  Borderline High:  150-199 mg/dL  High:  200-499 mg/dL  Very High:  Greater than or equal to 500 mg/dL     Direct Measure HDL  Female:  Greater than or equal to 50 mg/dL   Male:  Greater than or equal to 40 mg/dL     LDL Cholesterol  Desirable:  <100mg/dL  Above Desirable:  100-129 mg/dL   Borderline High:  130-159 mg/dL   High:  160-189 mg/dL   Very High:  >= 190 mg/dL     Non HDL Cholesterol  Desirable:  130 mg/dL  Above Desirable:  130-159 mg/dL  Borderline High:  160-189 mg/dL  High:  190-219 mg/dL  Very High:  Greater than or equal to 220 mg/dL   Hemoglobin A1c   Result Value Ref Range     Hemoglobin A1C 5.4 <5.7 %         Comment:         Normal <5.7%   Prediabetes 5.7-6.4%    Diabetes 6.5% or higher     Note: Adopted from ADA consensus guidelines.   AST   Result Value Ref Range     AST 64 (H) 0 - 45 U/L         Comment:         Reference intervals for this test were updated on 6/12/2023 to more accurately reflect our healthy population. There may be differences in the flagging of prior results with similar values performed with this method. Interpretation of those prior results can be made in the context of the updated reference intervals.   ALT   Result Value Ref Range      (H) 0 - 70 U/L         Comment:         Reference intervals for this test were updated on 6/12/2023 to more accurately reflect our healthy population. There may be differences in the flagging of prior results with similar values performed with this method. Interpretation of those prior results can be made in the context of the updated reference intervals.              Component      Latest Ref Rng 2/7/2023  8:17 AM   Cholesterol      <200 mg/dL 253 (H)    Triglycerides      <150 mg/dL 301 (H)    HDL Cholesterol      >=40 mg/dL 45    LDL  Cholesterol Calculated      <=100 mg/dL 148 (H)    Non HDL Cholesterol      <130 mg/dL 208 (H)    Glucose      70 - 99 mg/dL 96    Patient Fasting? Unknown    AST      10 - 50 U/L 62 (H)    ALT      10 - 50 U/L 95 (H)    Insulin      2.6 - 24.9 uU/mL 33.8 (H)       Legend:  (H) High

## 2023-08-02 LAB
SEMEN ANALYSIS P VAS PNL: NORMAL
SPERM MOTILE SMN QL MICRO: NORMAL

## 2023-08-02 PROCEDURE — 89321 SEMEN ANAL SPERM DETECTION: CPT | Performed by: UROLOGY

## 2023-12-02 ENCOUNTER — HEALTH MAINTENANCE LETTER (OUTPATIENT)
Age: 43
End: 2023-12-02

## 2024-08-02 ENCOUNTER — OFFICE VISIT (OUTPATIENT)
Dept: PEDIATRICS | Facility: CLINIC | Age: 44
End: 2024-08-02
Payer: COMMERCIAL

## 2024-08-02 VITALS
TEMPERATURE: 98.7 F | DIASTOLIC BLOOD PRESSURE: 89 MMHG | HEIGHT: 72 IN | RESPIRATION RATE: 20 BRPM | WEIGHT: 265 LBS | HEART RATE: 110 BPM | OXYGEN SATURATION: 97 % | BODY MASS INDEX: 35.89 KG/M2 | SYSTOLIC BLOOD PRESSURE: 121 MMHG

## 2024-08-02 DIAGNOSIS — K59.00 CONSTIPATION, UNSPECIFIED CONSTIPATION TYPE: ICD-10-CM

## 2024-08-02 DIAGNOSIS — D68.51 FACTOR V LEIDEN CARRIER (H): ICD-10-CM

## 2024-08-02 DIAGNOSIS — K76.0 METABOLIC DYSFUNCTION-ASSOCIATED STEATOTIC LIVER DISEASE (MASLD): ICD-10-CM

## 2024-08-02 DIAGNOSIS — L71.9 ROSACEA: Primary | ICD-10-CM

## 2024-08-02 PROCEDURE — 90471 IMMUNIZATION ADMIN: CPT | Performed by: STUDENT IN AN ORGANIZED HEALTH CARE EDUCATION/TRAINING PROGRAM

## 2024-08-02 PROCEDURE — 90651 9VHPV VACCINE 2/3 DOSE IM: CPT | Performed by: STUDENT IN AN ORGANIZED HEALTH CARE EDUCATION/TRAINING PROGRAM

## 2024-08-02 PROCEDURE — 36415 COLL VENOUS BLD VENIPUNCTURE: CPT | Performed by: STUDENT IN AN ORGANIZED HEALTH CARE EDUCATION/TRAINING PROGRAM

## 2024-08-02 PROCEDURE — 90472 IMMUNIZATION ADMIN EACH ADD: CPT | Performed by: STUDENT IN AN ORGANIZED HEALTH CARE EDUCATION/TRAINING PROGRAM

## 2024-08-02 PROCEDURE — 80053 COMPREHEN METABOLIC PANEL: CPT | Performed by: STUDENT IN AN ORGANIZED HEALTH CARE EDUCATION/TRAINING PROGRAM

## 2024-08-02 PROCEDURE — 90746 HEPB VACCINE 3 DOSE ADULT IM: CPT | Performed by: STUDENT IN AN ORGANIZED HEALTH CARE EDUCATION/TRAINING PROGRAM

## 2024-08-02 PROCEDURE — 99213 OFFICE O/P EST LOW 20 MIN: CPT | Mod: 25 | Performed by: STUDENT IN AN ORGANIZED HEALTH CARE EDUCATION/TRAINING PROGRAM

## 2024-08-02 RX ORDER — POLYETHYLENE GLYCOL 3350 17 G/17G
1 POWDER, FOR SOLUTION ORAL DAILY
COMMUNITY

## 2024-08-02 RX ORDER — METRONIDAZOLE 7.5 MG/G
GEL TOPICAL DAILY
Qty: 45 G | Refills: 1 | Status: SHIPPED | OUTPATIENT
Start: 2024-08-02

## 2024-08-02 RX ORDER — SENNOSIDES 8.6 MG
1 TABLET ORAL DAILY
COMMUNITY

## 2024-08-02 RX ORDER — METRONIDAZOLE 7.5 MG/G
GEL TOPICAL DAILY
COMMUNITY
End: 2024-08-02

## 2024-08-02 ASSESSMENT — PATIENT HEALTH QUESTIONNAIRE - PHQ9
10. IF YOU CHECKED OFF ANY PROBLEMS, HOW DIFFICULT HAVE THESE PROBLEMS MADE IT FOR YOU TO DO YOUR WORK, TAKE CARE OF THINGS AT HOME, OR GET ALONG WITH OTHER PEOPLE: NOT DIFFICULT AT ALL
SUM OF ALL RESPONSES TO PHQ QUESTIONS 1-9: 1
SUM OF ALL RESPONSES TO PHQ QUESTIONS 1-9: 1

## 2024-08-02 ASSESSMENT — PAIN SCALES - GENERAL: PAINLEVEL: NO PAIN (0)

## 2024-08-02 NOTE — PATIENT INSTRUCTIONS
Great to meet you!    We will get some labs and let you know the results     Trying to get more fiber can help with constipation- also some people find the low fodmaps diet helpful for IBS ([facility] has a good website)    Sometimes small routine changes can provide a lot of benefit

## 2024-08-02 NOTE — PROGRESS NOTES
"  Assessment & Plan     Rosacea  Notes a history of rosacea on metrogel with good control  - metroNIDAZOLE (METROGEL) 0.75 % external gel; Apply topically daily Apply to area of rosacea on forehead    Factor V Leiden carrier (H24)  Known carrier, no history of blood clots    Metabolic dysfunction-associated steatotic liver disease (MASLD)  Noted on prior imaging and has had persistent mild elevations in LFTs.  Discussed importance of lifestyle changes and also counseled re possible GLP-1 medications.  At  this time he is not interested in pursuing medications for weight loss and plans to start with dietary and exercise changes.  - Comprehensive metabolic panel (BMP + Alb, Alk Phos, ALT, AST, Total. Bili, TP); Future  - Comprehensive metabolic panel (BMP + Alb, Alk Phos, ALT, AST, Total. Bili, TP)    Constipation  Notes a history of constipation on senna, miralax and fiber.      BMI  Estimated body mass index is 36.09 kg/m  as calculated from the following:    Height as of this encounter: 1.825 m (5' 11.85\").    Weight as of this encounter: 120.2 kg (265 lb).             Barb Crews is a 44 year old, presenting for the following health issues:  Imm/Inj        8/2/2024     2:58 PM   Additional Questions   Roomed by LO   Accompanied by Self         8/2/2024     2:58 PM   Patient Reported Additional Medications   Patient reports taking the following new medications no     Imm/Inj    History of Present Illness       Reason for visit:  Check up    He eats 2-3 servings of fruits and vegetables daily.He consumes 3 sweetened beverage(s) daily.He exercises with enough effort to increase his heart rate 20 to 29 minutes per day.  He exercises with enough effort to increase his heart rate 4 days per week.   He is taking medications regularly.     Overall notes that things are going well.     Exercise includes- lifting weights, Occassional pickleball, Taking walks     Constipation   -had hemmorhoids in the past   Rabbit " "pellets sometimes   This has been a chronic issue for him  - takes senna, miralax and fiber    The 10-year ASCVD risk score (Fede DK, et al., 2019) is: 2.2%    Values used to calculate the score:      Age: 44 years      Sex: Male      Is Non- : No      Diabetic: No      Tobacco smoker: No      Systolic Blood Pressure: 121 mmHg      Is BP treated: No      HDL Cholesterol: 45 mg/dL      Total Cholesterol: 248 mg/dL                    Objective    /89   Pulse 110   Temp 98.7  F (37.1  C) (Oral)   Resp 20   Ht 1.825 m (5' 11.85\")   Wt 120.2 kg (265 lb)   SpO2 97%   BMI 36.09 kg/m    Body mass index is 36.09 kg/m .  Physical Exam   GENERAL: alert and no distress  EYES: Eyes grossly normal to inspection, PERRL and conjunctivae and sclerae normal  HENT: ear canals and TM's normal, nose and mouth without ulcers or lesions  NECK: no adenopathy, no asymmetry, masses, or scars  RESP: lungs clear to auscultation - no rales, rhonchi or wheezes  CV: regular rate and rhythm, normal S1 S2, no S3 or S4, no murmur, click or rub, no peripheral edema  ABDOMEN: soft, nontender, no hepatosplenomegaly, no masses and bowel sounds normal  MS: no gross musculoskeletal defects noted, no edema  SKIN: no suspicious lesions or rashes  NEURO: Normal strength and tone, mentation intact and speech normal  PSYCH: mentation appears normal, affect normal/bright            Signed Electronically by: Kerry Santacruz MD    "

## 2024-08-03 LAB
ALBUMIN SERPL BCG-MCNC: 4.9 G/DL (ref 3.5–5.2)
ALP SERPL-CCNC: 41 U/L (ref 40–150)
ALT SERPL W P-5'-P-CCNC: 83 U/L (ref 0–70)
ANION GAP SERPL CALCULATED.3IONS-SCNC: 12 MMOL/L (ref 7–15)
AST SERPL W P-5'-P-CCNC: 51 U/L (ref 0–45)
BILIRUB SERPL-MCNC: 0.5 MG/DL
BUN SERPL-MCNC: 12.8 MG/DL (ref 6–20)
CALCIUM SERPL-MCNC: 9.9 MG/DL (ref 8.8–10.4)
CHLORIDE SERPL-SCNC: 102 MMOL/L (ref 98–107)
CREAT SERPL-MCNC: 0.88 MG/DL (ref 0.67–1.17)
EGFRCR SERPLBLD CKD-EPI 2021: >90 ML/MIN/1.73M2
GLUCOSE SERPL-MCNC: 103 MG/DL (ref 70–99)
HCO3 SERPL-SCNC: 25 MMOL/L (ref 22–29)
POTASSIUM SERPL-SCNC: 4 MMOL/L (ref 3.4–5.3)
PROT SERPL-MCNC: 7.5 G/DL (ref 6.4–8.3)
SODIUM SERPL-SCNC: 139 MMOL/L (ref 135–145)

## 2025-01-05 ENCOUNTER — HEALTH MAINTENANCE LETTER (OUTPATIENT)
Age: 45
End: 2025-01-05

## 2025-01-13 ENCOUNTER — TELEPHONE (OUTPATIENT)
Dept: GASTROENTEROLOGY | Facility: CLINIC | Age: 45
End: 2025-01-13
Payer: COMMERCIAL

## 2025-01-13 NOTE — TELEPHONE ENCOUNTER
[facility] Call Center    Phone Message    May a detailed message be left on voicemail: Yes    Reason for Call: Other: Patient is currently scheduled on 2/17, as visit type New GI Urgent. This is outside the expected timeline for this referral. Patient has been added to the waitlist.      Action Taken: Message routed to:  Other: GI REFERRAL TRIAGE POOL     Travel Screening: Not Applicable

## 2025-01-14 NOTE — TELEPHONE ENCOUNTER
Action 01/14/2025 9:59 AM  [id number]   Outcome: Faxed a request to [facility] for images pushed to [facility] PACS       REFERRAL INFORMATION:  Referring Provider:  Glen Santacruz MD   Referring Clinic:  [facility]   Reason for Visit/Diagnosis: Constipation, unspecified constipation type // per pt // referred by GLEN SANTACRUZ      FUTURE VISIT INFORMATION:  Appointment Date: 2/17/25     NOTES STATUS DETAILS   OFFICE NOTE from Referring Provider Internal 1/7/25 myc, 8/21/24 Glen West MD    OFFICE NOTE from Other Specialist Care Everywhere 7/27/20 AMELIA- Roe Luz MD     MEDICATION LIST Internal    LABS     PERTINENT LABS Internal    IMAGES     CT In process-in PACS 7/30/20 CT Abd Pel

## 2025-01-27 ENCOUNTER — LAB (OUTPATIENT)
Dept: LAB | Facility: CLINIC | Age: 45
End: 2025-01-27
Payer: COMMERCIAL

## 2025-01-27 ENCOUNTER — OFFICE VISIT (OUTPATIENT)
Dept: GASTROENTEROLOGY | Facility: CLINIC | Age: 45
End: 2025-01-27
Attending: STUDENT IN AN ORGANIZED HEALTH CARE EDUCATION/TRAINING PROGRAM
Payer: COMMERCIAL

## 2025-01-27 VITALS
SYSTOLIC BLOOD PRESSURE: 137 MMHG | HEIGHT: 68 IN | WEIGHT: 270 LBS | BODY MASS INDEX: 40.92 KG/M2 | OXYGEN SATURATION: 97 % | HEART RATE: 83 BPM | DIASTOLIC BLOOD PRESSURE: 95 MMHG

## 2025-01-27 DIAGNOSIS — K59.00 CONSTIPATION, UNSPECIFIED CONSTIPATION TYPE: ICD-10-CM

## 2025-01-27 DIAGNOSIS — K59.00 CONSTIPATION, UNSPECIFIED CONSTIPATION TYPE: Primary | ICD-10-CM

## 2025-01-27 LAB
ERYTHROCYTE [DISTWIDTH] IN BLOOD BY AUTOMATED COUNT: 11.8 % (ref 10–15)
HCT VFR BLD AUTO: 44.6 % (ref 40–53)
HGB BLD-MCNC: 15.4 G/DL (ref 13.3–17.7)
MCH RBC QN AUTO: 30.1 PG (ref 26.5–33)
MCHC RBC AUTO-ENTMCNC: 34.5 G/DL (ref 31.5–36.5)
MCV RBC AUTO: 87 FL (ref 78–100)
PLATELET # BLD AUTO: 344 10E3/UL (ref 150–450)
RBC # BLD AUTO: 5.12 10E6/UL (ref 4.4–5.9)
WBC # BLD AUTO: 7.8 10E3/UL (ref 4–11)

## 2025-01-27 PROCEDURE — 85027 COMPLETE CBC AUTOMATED: CPT | Performed by: PATHOLOGY

## 2025-01-27 PROCEDURE — 99000 SPECIMEN HANDLING OFFICE-LAB: CPT | Performed by: PATHOLOGY

## 2025-01-27 PROCEDURE — 86364 TISS TRNSGLTMNASE EA IG CLAS: CPT | Performed by: PHYSICIAN ASSISTANT

## 2025-01-27 PROCEDURE — 82784 ASSAY IGA/IGD/IGG/IGM EACH: CPT | Performed by: PHYSICIAN ASSISTANT

## 2025-01-27 PROCEDURE — 36415 COLL VENOUS BLD VENIPUNCTURE: CPT | Performed by: PATHOLOGY

## 2025-01-27 ASSESSMENT — PAIN SCALES - GENERAL: PAINLEVEL_OUTOF10: NO PAIN (0)

## 2025-01-27 NOTE — PROGRESS NOTES
GI CLINIC VISIT    CC/REFERRING MD:  Kerry Santacruz  REASON FOR CONSULTATION: constipation    ASSESSMENT/PLAN:  Mars Beck is a 44 year old year old male with PMHx significant for Factor V Leiden deficiency who [facility] seeing [facility] for constipation x 5 years. Endorses unsatisfactory bowel movements that are thin, small volumed and pebble like in nature - it is associated with daily abdominal tightness (?pain) that improves after defecation. He has tried multiple OTC agents without good long-term success. No bloody stools, unintentional weight loss. No fhx of CRC.     TSH and CMP were wnl. No recent CBC.     He has never had a colonoscopy    #constipation x 5 years  Given abdominal pain/tightness sensation, he does meet Dl IV criteria for IBS-C. Ddx includes dietary/lifestyle factors affecting stooling, slow transit constipation, malabsorption, intraluminal mass vs other. TSH and metabolic panel WNL.     -we discussed utility of colonoscopy given the change in bowel pattern and passage of thin stools. He verbalized understanding but would like to hold on Cscope today  -order CBC   -Rx Linzess, indication and SE reviewed. May send another agent (amitiza) depending on insurance coverage, pt will let me know on MyChart.   -toileting techniques per AVS    Future consideration  -would recommend to proceed with colonoscopy if he is anemia (particularly, HILARY)   -breath testing for IMO    #hepatic steatosis   Fatty deposits seen on cross sectional imaging, CTAP W contrast 2020  Plan to update CBC with ptl and will run Fib-4 score. If >1.30, plan for fibrosis scan     No orders of the defined types were placed in this encounter.      RTC 2 mo or sooner PRN to review start of linzess     Thank you for this consultation.  It was a pleasure to participate in the care of this patient; please contact me with any further questions.     Marlen Power PA-C    Follow up: As planned above. Today, I  personally spent 25 minutes in direct face to face time with the patient, of which greater than 50% of the time was spent in patient education and counseling as described above. Approximately 20 minutes were spent on indirect care associated with the patient's consultation including but not limited to review of: patient medical records to date, clinic visits, hospital records, lab results, imaging studies, procedural documentation, and coordinating care with other providers. The findings from this review are summarized in the above note. All of the above accounted for a cumulative time of 45 minutes and was performed on the date of service.     HPI  Mars Beck is a 44 year old year old male with PMHx of Factor V leiden following with the [facility] GI group for constipation.     1. Constipation x 5 years  Premorbid stooling pattern - daily with regularity     -can't recall when he last had a satisfactory BM  -can go a few days without a BM, in cases like this, he will take 1/2 bottle mag citrate - but feels it is hard on his gut   -reports passing thin flat stools or rabbit pellet BMs; coupled movements are common   -endorses incomplete evacuation  -on toilet for 10 min to stool  -some straining with BM   -no bloody or black stools  -reports abd tightness/fullness (high pain threshold - so states could be painful) that improves after BM or flatus  This is a daily sensation.   -tried gas-X didn't feel it helped with the stooling     Appetite overall stable . Denies unintentional weight loss    -Meds tried - miralax (constant diarrhea - 1 full cap), fiber gummies, magnesium citrate 1/2 bottle   -Worked on diet which helps somewhat but only temporarily - states there's some room for improvement with fiber intake  -Endorses good hydration  -Reports adequate fiber intake   -Activity level - sedentary job but exercises a lot; after a walk, if he lays prone, shares he is able to pass gas and that releases the abdominal  "discomfort sensation     -Occasional heartburn - thinks its related to diet and stress. No dyspahgia, odynphagia     2. Had appendicitis in 2020 - shares he \"toughed\" it out and did not get surgery. No further episodes of this specific abd pain; may have had an episode when he was younger    Family Hx  Dad - blood clot in small intestines requiring resection (Factor V leiden)   Mom = bladder cancer   No other known family history or GI related malignancy (esophageal, gastric, pancreatic, liver or colon) or family history of IBD/celiac disease.     Social Hx   occasional use of NSAIDs -  few times a week   Yes ETOH - once weekly, 4-5 drinks, hard alcohol or whiskey   Never tobacco products   No recreational drug use     PROBLEM LIST  Patient Active Problem List    Diagnosis Date Noted    Rosacea 08/02/2024     Priority: Medium    Factor V Leiden carrier 08/02/2024     Priority: Medium       PERTINENT PAST MEDICAL HISTORY:  No past medical history on file.    PREVIOUS SURGERIES:  Past Surgical History:   Procedure Laterality Date    VASECTOMY         ALLERGIES:   No Known Allergies    PERTINENT MEDICATIONS:    Current Outpatient Medications:     [employer] FIBER GUMMIES PO, , Disp: , Rfl:     metroNIDAZOLE (METROGEL) 0.75 % external gel, Apply topically daily Apply to area of rosacea on forehead, Disp: 45 g, Rfl: 1    polyethylene glycol (MIRALAX) 17 g packet, Take 1 packet by mouth daily, Disp: , Rfl:     sennosides (SENOKOT) 8.6 MG tablet, Take 1 tablet by mouth daily, Disp: , Rfl:     SOCIAL HISTORY:  Social History     Socioeconomic History    Marital status: Single     Spouse name: Not on file    Number of children: Not on file    Years of education: Not on file    Highest education level: Not on file   Occupational History    Not on file   Tobacco Use    Smoking status: Never     Passive exposure: Never    Smokeless tobacco: Never   Vaping Use    Vaping status: Never Used   Substance and Sexual Activity    Alcohol use: " Yes     Alcohol/week: 6.0 standard drinks of alcohol     Types: 6 Standard drinks or equivalent per week     Comment: weekends    Drug use: Never    Sexual activity: Yes     Partners: Female     Birth control/protection: Male Surgical   Other Topics Concern    Not on file   Social History Narrative    Lives at home with wife Celsa Cisneros     3 kids half the time     3 cats     [place] data     Social Drivers of Health     Financial Resource Strain: Low Risk  (10/18/2022)    Overall Financial Resource Strain (CARDIA)     Difficulty of Paying Living Expenses: Not hard at all   Food Insecurity: No Food Insecurity (10/18/2022)    Hunger Vital Sign     Worried About Running Out of Food in the Last Year: Never true     Ran Out of Food in the Last Year: Never true   Transportation Needs: No Transportation Needs (10/18/2022)    PRAPARE - Transportation     Lack of Transportation (Medical): No     Lack of Transportation (Non-Medical): No   Physical Activity: Insufficiently Active (10/18/2022)    Exercise Vital Sign     Days of Exercise per Week: 6 days     Minutes of Exercise per Session: 20 min   Stress: No Stress Concern Present (10/18/2022)    [place] [employer] - Occupational Stress Questionnaire     Feeling of Stress : Not at all   Social Connections: Moderately Isolated (10/18/2022)    Social Connection and Isolation Panel [NHANES]     Frequency of Communication with Friends and Family: More than three times a week     Frequency of Social Gatherings with Friends and Family: Patient declined     Attends Judaism Services: Never     Active Member of Clubs or Organizations: No     Attends Club or Organization Meetings: Not on file     Marital Status: Living with partner   Interpersonal Safety: Low Risk  (8/2/2024)    Interpersonal Safety     Do you feel physically and emotionally safe where you currently live?: Yes     Within the past 12 months, have you been hit, slapped, kicked or otherwise physically  "hurt by someone?: No     Within the past 12 months, have you been humiliated or emotionally abused in other ways by your partner or ex-partner?: No   Housing Stability: Low Risk  (10/18/2022)    Housing Stability Vital Sign     Unable to Pay for Housing in the Last Year: No     Number of Places Lived in the Last Year: 1     Unstable Housing in the Last Year: No       FAMILY HISTORY:  Family History   Problem Relation Age of Onset    Bladder Cancer Mother     Thrombosis Father     Thyroid Cancer Father     Factor V Leiden deficiency Father     Coronary Artery Disease Maternal Grandmother     Bladder Cancer Maternal Grandfather         on hospice       Past/family/social history reviewed and no changes    PHYSICAL EXAMINATION:  Vitals reviewed: BP (!) 137/95   Pulse 83   Ht 1.727 m (5' 8\")   Wt 122.5 kg (270 lb)   SpO2 97%   BMI 41.05 kg/m    Constitutional: aaox3, cooperative, pleasant, not dyspneic/diaphoretic, no acute distress  Eyes: Sclera anicteric/injected  Ears/nose/mouth/throat: hearing intact  Neck: supple, active ROM w/o limitation or pain   CV: No edema  Respiratory: Unlabored breathing  Abd:  distension, hypertympanic to percussion, mild tenderness to palpitation throughout abdomen, no peritoneal signs, neg Hatfield's sig  Skin: warm, perfused, no jaundice  Psych: Normal affect  MSK: Normal gait     PERTINENT STUDIES:    Office Visit on 08/02/2024   Component Date Value Ref Range Status    Sodium 08/02/2024 139  135 - 145 mmol/L Final    Potassium 08/02/2024 4.0  3.4 - 5.3 mmol/L Final    Carbon Dioxide (CO2) 08/02/2024 25  22 - 29 mmol/L Final    Anion Gap 08/02/2024 12  7 - 15 mmol/L Final    Urea Nitrogen 08/02/2024 12.8  6.0 - 20.0 mg/dL Final    Creatinine 08/02/2024 0.88  0.67 - 1.17 mg/dL Final    GFR Estimate 08/02/2024 >90  >60 mL/min/1.73m2 Final    Calcium 08/02/2024 9.9  8.8 - 10.4 mg/dL Final    Chloride 08/02/2024 102  98 - 107 mmol/L Final    Glucose 08/02/2024 103 (H)  70 - 99 mg/dL " Final    Alkaline Phosphatase 08/02/2024 41  40 - 150 U/L Final    AST 08/02/2024 51 (H)  0 - 45 U/L Final    ALT 08/02/2024 83 (H)  0 - 70 U/L Final    Protein Total 08/02/2024 7.5  6.4 - 8.3 g/dL Final    Albumin 08/02/2024 4.9  3.5 - 5.2 g/dL Final    Bilirubin Total 08/02/2024 0.5  <=1.2 mg/dL Final        Lab Results   Component Value Date    WBC 4.8 07/23/2008    HGB 15.4 02/07/2023    HGB 15.0 07/23/2008     07/23/2008    CHOL 248 (H) 06/16/2023    CHOL 253 (H) 02/07/2023    CHOL 267 (H) 10/18/2022    TRIG 160 (H) 06/16/2023    TRIG 301 (H) 02/07/2023    TRIG 361 (H) 10/18/2022    HDL 45 06/16/2023    HDL 45 02/07/2023    HDL 41 10/18/2022    ALT 83 (H) 08/02/2024     (H) 06/16/2023    ALT 95 (H) 02/07/2023    AST 51 (H) 08/02/2024    AST 64 (H) 06/16/2023    AST 62 (H) 02/07/2023     08/02/2024     10/18/2022     07/23/2008    BUN 12.8 08/02/2024    BUN 12 10/18/2022    BUN 14 07/23/2008    CO2 25 08/02/2024    CO2 25 10/18/2022    CO2 26 07/23/2008    TSH 1.93 02/07/2023    TSH 1.08 07/23/2008        Liver Function Studies -   Recent Labs   Lab Test 08/02/24  1654   PROTTOTAL 7.5   ALBUMIN 4.9   BILITOTAL 0.5   ALKPHOS 41   AST 51*   ALT 83*        PREVIOUS ENDOSCOPY    No results found for this or any previous visit.

## 2025-01-27 NOTE — NURSING NOTE
"Do you have a history of colon cancer in your immediate family? NO    If yes who: negative     And what age  were they diagnosed:       Chief Complaint   Patient presents with    New Patient       Vitals:    01/27/25 1127   BP: (!) 137/95   Pulse: 83   SpO2: 97%   Weight: 122.5 kg (270 lb)   Height: 1.727 m (5' 8\")       Body mass index is 41.05 kg/m .    Shay Christopher MA  Blood pressure elevated. Provider notified. Recheck offered.     "

## 2025-01-27 NOTE — LETTER
1/27/2025      Mars Beck  [address] [place] [address]      Dear Colleague,    Thank you for referring your patient, Mars Beck, to the [facility] [place]. Please see a copy of my visit note below.      GI CLINIC VISIT    CC/REFERRING MD:  Kerry Santacruz  REASON FOR CONSULTATION: constipation    ASSESSMENT/PLAN:  Mars Beck is a 44 year old year old male with PMHx significant for Factor V Leiden deficiency who presents seeing the  [facility] GI team for constipation x 5 years. Endorses unsatisfactory bowel movements that are thin, small volumed and pebble like in nature - it is associated with daily abdominal tightness (?pain) that improves after defecation. He has tried multiple OTC agents without good long-term success. No bloody stools, unintentional weight loss. No fhx of CRC.     TSH and CMP were wnl. No recent CBC.     He has never had a colonoscopy    #constipation x 5 years  Given abdominal pain/tightness sensation, he does meet Dl IV criteria for IBS-C. Ddx includes dietary/lifestyle factors affecting stooling, slow transit constipation, malabsorption, intraluminal mass vs other. TSH and metabolic panel WNL.     -we discussed utility of colonoscopy given the change in bowel pattern and passage of thin stools. He verbalized understanding but would like to hold on Cscope today  -order CBC   -Rx Linzess, indication and SE reviewed. May send another agent (amitiza) depending on insurance coverage, pt will let me know on MyChart.   -toileting techniques per AVS    Future consideration  -would recommend to proceed with colonoscopy if he is anemia (particularly, HILARY)   -breath testing for IMO    #hepatic steatosis   Fatty deposits seen on cross sectional imaging, CTAP W contrast 2020  Plan to update CBC with ptl and will run Fib-4 score. If >1.30, plan for fibrosis scan     No orders of the defined types were placed in this encounter.      RTC 2 mo  or sooner PRN to review start of linzess     Thank you for this consultation.  It was a pleasure to participate in the care of this patient; please contact me with any further questions.     Marlen Power PA-C    Follow up: As planned above. Today, I personally spent 25 minutes in direct face to face time with the patient, of which greater than 50% of the time was spent in patient education and counseling as described above. Approximately 20 minutes were spent on indirect care associated with the patient's consultation including but not limited to review of: patient medical records to date, clinic visits, hospital records, lab results, imaging studies, procedural documentation, and coordinating care with other providers. The findings from this review are summarized in the above note. All of the above accounted for a cumulative time of 45 minutes and was performed on the date of service.     JUAN Beck is a 44 year old year old male with PMHx of Factor V leiden following with the [facility] GI group for constipation.     1. Constipation x 5 years  Premorbid stooling pattern - daily with regularity     -can't recall when he last had a satisfactory BM  -can go a few days without a BM, in cases like this, he will take 1/2 bottle mag citrate - but feels it is hard on his gut   -reports passing thin flat stools or rabbit pellet BMs; coupled movements are common   -endorses incomplete evacuation  -on toilet for 10 min to stool  -some straining with BM   -no bloody or black stools  -reports abd tightness/fullness (high pain threshold - so states could be painful) that improves after BM or flatus  This is a daily sensation.   -tried gas-X didn't feel it helped with the stooling     Appetite overall stable . Denies unintentional weight loss    -Meds tried - miralax (constant diarrhea - 1 full cap), fiber gummies, magnesium citrate 1/2 bottle   -Worked on diet which helps somewhat but only temporarily - states there's some  "room for improvement with fiber intake  -Endorses good hydration  -Reports adequate fiber intake   -Activity level - sedentary job but exercises a lot; after a walk, if he lays prone, shares he is able to pass gas and that releases the abdominal discomfort sensation     -Occasional heartburn - thinks its related to diet and stress. No dyspahgia, odynphagia     2. Had appendicitis in 2020 - shares he \"toughed\" it out and did not get surgery. No further episodes of this specific abd pain; may have had an episode when he was younger    Family Hx  Dad - blood clot in small intestines requiring resection (Factor V leiden)   Mom = bladder cancer   No other known family history or GI related malignancy (esophageal, gastric, pancreatic, liver or colon) or family history of IBD/celiac disease.     Social Hx   occasional use of NSAIDs -  few times a week   Yes ETOH - once weekly, 4-5 drinks, hard alcohol or whiskey   Never tobacco products   No recreational drug use     PROBLEM LIST  Patient Active Problem List    Diagnosis Date Noted     Rosacea 08/02/2024     Priority: Medium     Factor V Leiden carrier 08/02/2024     Priority: Medium       PERTINENT PAST MEDICAL HISTORY:  No past medical history on file.    PREVIOUS SURGERIES:  Past Surgical History:   Procedure Laterality Date     VASECTOMY         ALLERGIES:   No Known Allergies    PERTINENT MEDICATIONS:    Current Outpatient Medications:      [employer] FIBER GUMMIES PO, , Disp: , Rfl:      metroNIDAZOLE (METROGEL) 0.75 % external gel, Apply topically daily Apply to area of rosacea on forehead, Disp: 45 g, Rfl: 1     polyethylene glycol (MIRALAX) 17 g packet, Take 1 packet by mouth daily, Disp: , Rfl:      sennosides (SENOKOT) 8.6 MG tablet, Take 1 tablet by mouth daily, Disp: , Rfl:     SOCIAL HISTORY:  Social History     Socioeconomic History     Marital status: Single     Spouse name: Not on file     Number of children: Not on file     Years of education: Not on file     " Highest education level: Not on file   Occupational History     Not on file   Tobacco Use     Smoking status: Never     Passive exposure: Never     Smokeless tobacco: Never   Vaping Use     Vaping status: Never Used   Substance and Sexual Activity     Alcohol use: Yes     Alcohol/week: 6.0 standard drinks of alcohol     Types: 6 Standard drinks or equivalent per week     Comment: weekends     Drug use: Never     Sexual activity: Yes     Partners: Female     Birth control/protection: Male Surgical   Other Topics Concern     Not on file   Social History Narrative    Lives at home with wife Celsa Cisneros     3 kids half the time     3 cats     [place] data     Social Drivers of Health     Financial Resource Strain: Low Risk  (10/18/2022)    Overall Financial Resource Strain (CARDIA)      Difficulty of Paying Living Expenses: Not hard at all   Food Insecurity: No Food Insecurity (10/18/2022)    Hunger Vital Sign      Worried About Running Out of Food in the Last Year: Never true      Ran Out of Food in the Last Year: Never true   Transportation Needs: No Transportation Needs (10/18/2022)    PRAPARE - Transportation      Lack of Transportation (Medical): No      Lack of Transportation (Non-Medical): No   Physical Activity: Insufficiently Active (10/18/2022)    Exercise Vital Sign      Days of Exercise per Week: 6 days      Minutes of Exercise per Session: 20 min   Stress: No Stress Concern Present (10/18/2022)    [employer] [place] [employer] - Occupational Stress Questionnaire      Feeling of Stress : Not at all   Social Connections: Moderately Isolated (10/18/2022)    Social Connection and Isolation Panel [NHANES]      Frequency of Communication with Friends and Family: More than three times a week      Frequency of Social Gatherings with Friends and Family: Patient declined      Attends [employer] Services: Never      Active Member of Clubs or Organizations: No      Attends Club or Organization Meetings: Not on  "file      Marital Status: Living with partner   Interpersonal Safety: Low Risk  (8/2/2024)    Interpersonal Safety      Do you feel physically and emotionally safe where you currently live?: Yes      Within the past 12 months, have you been hit, slapped, kicked or otherwise physically hurt by someone?: No      Within the past 12 months, have you been humiliated or emotionally abused in other ways by your partner or ex-partner?: No   Housing Stability: Low Risk  (10/18/2022)    Housing Stability Vital Sign      Unable to Pay for Housing in the Last Year: No      Number of Places Lived in the Last Year: 1      Unstable Housing in the Last Year: No       FAMILY HISTORY:  Family History   Problem Relation Age of Onset     Bladder Cancer Mother      Thrombosis Father      Thyroid Cancer Father      Factor V Leiden deficiency Father      Coronary Artery Disease Maternal Grandmother      Bladder Cancer Maternal Grandfather         on hospice       Past/family/social history reviewed and no changes    PHYSICAL EXAMINATION:  Vitals reviewed: BP (!) 137/95   Pulse 83   Ht 1.727 m (5' 8\")   Wt 122.5 kg (270 lb)   SpO2 97%   BMI 41.05 kg/m    Constitutional: aaox3, cooperative, pleasant, not dyspneic/diaphoretic, no acute distress  Eyes: Sclera anicteric/injected  Ears/nose/mouth/throat: hearing intact  Neck: supple, active ROM w/o limitation or pain   CV: No edema  Respiratory: Unlabored breathing  Abd:  distension, hypertympanic to percussion, mild tenderness to palpitation throughout abdomen, no peritoneal signs, neg Hatfield's sig  Skin: warm, perfused, no jaundice  Psych: Normal affect  MSK: Normal gait     PERTINENT STUDIES:    Office Visit on 08/02/2024   Component Date Value Ref Range Status     Sodium 08/02/2024 139  135 - 145 mmol/L Final     Potassium 08/02/2024 4.0  3.4 - 5.3 mmol/L Final     Carbon Dioxide (CO2) 08/02/2024 25  22 - 29 mmol/L Final     Anion Gap 08/02/2024 12  7 - 15 mmol/L Final     Urea " Nitrogen 08/02/2024 12.8  6.0 - 20.0 mg/dL Final     Creatinine 08/02/2024 0.88  0.67 - 1.17 mg/dL Final     GFR Estimate 08/02/2024 >90  >60 mL/min/1.73m2 Final     Calcium 08/02/2024 9.9  8.8 - 10.4 mg/dL Final     Chloride 08/02/2024 102  98 - 107 mmol/L Final     Glucose 08/02/2024 103 (H)  70 - 99 mg/dL Final     Alkaline Phosphatase 08/02/2024 41  40 - 150 U/L Final     AST 08/02/2024 51 (H)  0 - 45 U/L Final     ALT 08/02/2024 83 (H)  0 - 70 U/L Final     Protein Total 08/02/2024 7.5  6.4 - 8.3 g/dL Final     Albumin 08/02/2024 4.9  3.5 - 5.2 g/dL Final     Bilirubin Total 08/02/2024 0.5  <=1.2 mg/dL Final        Lab Results   Component Value Date    WBC 4.8 07/23/2008    HGB 15.4 02/07/2023    HGB 15.0 07/23/2008     07/23/2008    CHOL 248 (H) 06/16/2023    CHOL 253 (H) 02/07/2023    CHOL 267 (H) 10/18/2022    TRIG 160 (H) 06/16/2023    TRIG 301 (H) 02/07/2023    TRIG 361 (H) 10/18/2022    HDL 45 06/16/2023    HDL 45 02/07/2023    HDL 41 10/18/2022    ALT 83 (H) 08/02/2024     (H) 06/16/2023    ALT 95 (H) 02/07/2023    AST 51 (H) 08/02/2024    AST 64 (H) 06/16/2023    AST 62 (H) 02/07/2023     08/02/2024     10/18/2022     07/23/2008    BUN 12.8 08/02/2024    BUN 12 10/18/2022    BUN 14 07/23/2008    CO2 25 08/02/2024    CO2 25 10/18/2022    CO2 26 07/23/2008    TSH 1.93 02/07/2023    TSH 1.08 07/23/2008        Liver Function Studies -   Recent Labs   Lab Test 08/02/24  1654   PROTTOTAL 7.5   ALBUMIN 4.9   BILITOTAL 0.5   ALKPHOS 41   AST 51*   ALT 83*        PREVIOUS ENDOSCOPY    No results found for this or any previous visit.      Again, thank you for allowing me to participate in the care of your patient.        Sincerely,        Marlen Power PA-C    Electronically signed

## 2025-01-27 NOTE — PATIENT INSTRUCTIONS
It was a pleasure taking care of you today.  I've included a brief summary of our discussion and care plan from today's visit below.  Please review this information with your primary care provider.  _______________________________________________________________________    My recommendations are summarized as follows:    Blood work ordered   Start of medication - linzess - sent to your pharmacy   Discuss colonoscopy - we can revisit this at the next appointment     Please call our clinic or send a Urgehart message to us if you have any questions or concerns. MyChart messages are answered by your nurse or doctor typically within 24 hours.  Please allow extra time on weekends and holidays    Return to GI Clinic in 2 months to review your progress.    _______________________________________________________________________    How do I schedule labs, imaging studies, or procedures that were ordered in clinic today?      Labs: To schedule lab appointment at the [facility], use my chart or call [phone]. If you have a [place] lab closer to home where you are regularly seen you can give them a call.      Procedures: If a colonoscopy, upper endoscopy, breath test, esophageal manometry, or pH impedence was ordered today, our endoscopy team will call you to schedule this. If you have not heard from our endoscopy team within a week, please call [phone] option 2 to schedule.      Imaging Studies: If you were scheduled for a CT scan, X-ray, MRI, ultrasound, HIDA scan, EKG or other imaging study, please call [phone] to have this scheduled.      Referral: If a referral to another specialty was ordered, expect a phone call or follow instructions above. If you have not heard from anyone regarding your referral in a week, please call our clinic to check the status.      Who do I call with any questions after my visit?  Please be in touch if there are any further questions that arise following today's  "visit.  There are multiple ways to contact your gastroenterology care team.       During business hours, you may reach a Gastroenterology nurse at [phone]     To schedule or reschedule an appointment, please call [phone].      You can always send a secure message through Lamoda.  [employer]oda messages are answered by your nurse or doctor typically within 24 hours.  Please allow extra time on weekends and holidays.       For urgent/emergent questions after business hours, you may reach the on-call GI Fellow by contacting the [facility] at [phone].     How will I get the results of any tests ordered?    You will receive all of your results.  If you have signed up for [employer], any tests ordered at your visit will be available to you after your physician reviews them.  Typically this takes 1-2 weeks.  If there are urgent results that require a change in your care plan, your physician or nurse will call you to discuss the next steps.       What is Lamoda?  Lamoda is a secure way for you to access all of your healthcare records from the [facility].  It is a web based computer program, so you can sign on to it from any location.  It also allows you to send secure messages to your care team.  I recommend signing up for Lamoda access if you have not already done so and are comfortable with using a computer.       How to I schedule a follow-up visit?  If you did not schedule a follow-up visit today, please call [phone] to schedule a follow-up office visit.      Sincerely,    Marlen Power PA-C  Gastroenterology    ---  Toileting techniques  -don t ignore the urge to defecate (try not to hold it in). Normal to have a bowel movement after waking in morning, after eating, or whenever \"nature calls\"  -limit time pushing to less than 10 min on the toilet. If you are not able to have a bowel movement, stop, and try again when you have the urge    Positioning  1. Elevate knees above " hips (use a squatty potty)?  2. Lean forward, elbows rested on knees   3. Bulge out abdomen, and straighten your spine    Correct position  -knees higher than hips  -lean forward and put elbows on knees  ?-bulge our your abdomen  -straighten your spine    Position is similar to Carlos puente  [employer]

## 2025-01-28 LAB — IGA SERPL-MCNC: 269 MG/DL (ref 84–499)

## 2025-01-30 LAB
TTG IGA SER-ACNC: 0.8 U/ML
TTG IGG SER-ACNC: 0.8 U/ML

## 2025-02-17 ENCOUNTER — PRE VISIT (OUTPATIENT)
Dept: GASTROENTEROLOGY | Facility: CLINIC | Age: 45
End: 2025-02-17